# Patient Record
Sex: MALE | Race: ASIAN | NOT HISPANIC OR LATINO | URBAN - METROPOLITAN AREA
[De-identification: names, ages, dates, MRNs, and addresses within clinical notes are randomized per-mention and may not be internally consistent; named-entity substitution may affect disease eponyms.]

---

## 2017-07-14 ENCOUNTER — OUTPATIENT (OUTPATIENT)
Dept: EMERGENCY DEPT | Facility: HOSPITAL | Age: 22
LOS: 1 days | Discharge: HOME | End: 2017-07-14

## 2017-07-14 VITALS
HEART RATE: 57 BPM | RESPIRATION RATE: 18 BRPM | SYSTOLIC BLOOD PRESSURE: 136 MMHG | OXYGEN SATURATION: 100 % | DIASTOLIC BLOOD PRESSURE: 83 MMHG | TEMPERATURE: 98 F

## 2017-07-14 NOTE — ED ADULT TRIAGE NOTE - CHIEF COMPLAINT QUOTE
Pt states he was eating steak and began choking after swallowing a piece of the meat; states a family member performed the Heimlich Maneuver on him but nothing was expelled and he has the lingering sensation that the meat is still lodged in his throat. Pts states similar incident happened to him 3 years ago with chicken and surgical removal was necessary. Pt able to speak in complete clear sentences, respirations even and non labored.

## 2017-07-15 VITALS
HEIGHT: 65 IN | DIASTOLIC BLOOD PRESSURE: 97 MMHG | TEMPERATURE: 98 F | WEIGHT: 214.51 LBS | HEART RATE: 67 BPM | SYSTOLIC BLOOD PRESSURE: 119 MMHG | RESPIRATION RATE: 16 BRPM | OXYGEN SATURATION: 100 %

## 2017-07-15 DIAGNOSIS — Z29.9 ENCOUNTER FOR PROPHYLACTIC MEASURES, UNSPECIFIED: ICD-10-CM

## 2017-07-15 DIAGNOSIS — T18.128A FOOD IN ESOPHAGUS CAUSING OTHER INJURY, INITIAL ENCOUNTER: ICD-10-CM

## 2017-07-15 DIAGNOSIS — R74.0 NONSPECIFIC ELEVATION OF LEVELS OF TRANSAMINASE AND LACTIC ACID DEHYDROGENASE [LDH]: ICD-10-CM

## 2017-07-15 DIAGNOSIS — E87.8 OTHER DISORDERS OF ELECTROLYTE AND FLUID BALANCE, NOT ELSEWHERE CLASSIFIED: ICD-10-CM

## 2017-07-15 LAB
ALBUMIN SERPL ELPH-MCNC: 4.6 G/DL — SIGNIFICANT CHANGE UP (ref 3.3–5)
ALP SERPL-CCNC: 61 U/L — SIGNIFICANT CHANGE UP (ref 60–270)
ALT FLD-CCNC: 29 U/L — SIGNIFICANT CHANGE UP (ref 4–41)
AST SERPL-CCNC: 46 U/L — HIGH (ref 4–40)
BASOPHILS # BLD AUTO: 0.05 K/UL — SIGNIFICANT CHANGE UP (ref 0–0.2)
BASOPHILS NFR BLD AUTO: 0.7 % — SIGNIFICANT CHANGE UP (ref 0–2)
BILIRUB SERPL-MCNC: 0.6 MG/DL — SIGNIFICANT CHANGE UP (ref 0.2–1.2)
BUN SERPL-MCNC: 20 MG/DL — SIGNIFICANT CHANGE UP (ref 7–23)
CALCIUM SERPL-MCNC: 8.9 MG/DL — SIGNIFICANT CHANGE UP (ref 8.4–10.5)
CHLORIDE SERPL-SCNC: 105 MMOL/L — SIGNIFICANT CHANGE UP (ref 98–107)
CO2 SERPL-SCNC: 18 MMOL/L — LOW (ref 22–31)
CREAT SERPL-MCNC: 0.82 MG/DL — SIGNIFICANT CHANGE UP (ref 0.5–1.3)
EOSINOPHIL # BLD AUTO: 0.19 K/UL — SIGNIFICANT CHANGE UP (ref 0–0.5)
EOSINOPHIL NFR BLD AUTO: 2.5 % — SIGNIFICANT CHANGE UP (ref 0–6)
GLUCOSE SERPL-MCNC: 89 MG/DL — SIGNIFICANT CHANGE UP (ref 70–99)
HCT VFR BLD CALC: 43.8 % — SIGNIFICANT CHANGE UP (ref 39–50)
HGB BLD-MCNC: 15.4 G/DL — SIGNIFICANT CHANGE UP (ref 13–17)
IMM GRANULOCYTES # BLD AUTO: 0.03 # — SIGNIFICANT CHANGE UP
IMM GRANULOCYTES NFR BLD AUTO: 0.4 % — SIGNIFICANT CHANGE UP (ref 0–1.5)
LYMPHOCYTES # BLD AUTO: 2.44 K/UL — SIGNIFICANT CHANGE UP (ref 1–3.3)
LYMPHOCYTES # BLD AUTO: 32.2 % — SIGNIFICANT CHANGE UP (ref 13–44)
MCHC RBC-ENTMCNC: 27.9 PG — SIGNIFICANT CHANGE UP (ref 27–34)
MCHC RBC-ENTMCNC: 35.2 % — SIGNIFICANT CHANGE UP (ref 32–36)
MCV RBC AUTO: 79.3 FL — LOW (ref 80–100)
MONOCYTES # BLD AUTO: 0.63 K/UL — SIGNIFICANT CHANGE UP (ref 0–0.9)
MONOCYTES NFR BLD AUTO: 8.3 % — SIGNIFICANT CHANGE UP (ref 2–14)
NEUTROPHILS # BLD AUTO: 4.24 K/UL — SIGNIFICANT CHANGE UP (ref 1.8–7.4)
NEUTROPHILS NFR BLD AUTO: 55.9 % — SIGNIFICANT CHANGE UP (ref 43–77)
NRBC # FLD: 0 — SIGNIFICANT CHANGE UP
PLATELET # BLD AUTO: 229 K/UL — SIGNIFICANT CHANGE UP (ref 150–400)
PMV BLD: 11.1 FL — SIGNIFICANT CHANGE UP (ref 7–13)
POTASSIUM SERPL-MCNC: SIGNIFICANT CHANGE UP MMOL/L (ref 3.5–5.3)
POTASSIUM SERPL-SCNC: SIGNIFICANT CHANGE UP MMOL/L (ref 3.5–5.3)
PROT SERPL-MCNC: 8.6 G/DL — HIGH (ref 6–8.3)
RBC # BLD: 5.52 M/UL — SIGNIFICANT CHANGE UP (ref 4.2–5.8)
RBC # FLD: 13.5 % — SIGNIFICANT CHANGE UP (ref 10.3–14.5)
SODIUM SERPL-SCNC: 141 MMOL/L — SIGNIFICANT CHANGE UP (ref 135–145)
WBC # BLD: 7.58 K/UL — SIGNIFICANT CHANGE UP (ref 3.8–10.5)
WBC # FLD AUTO: 7.58 K/UL — SIGNIFICANT CHANGE UP (ref 3.8–10.5)

## 2017-07-15 RX ORDER — ONDANSETRON 8 MG/1
4 TABLET, FILM COATED ORAL EVERY 8 HOURS
Qty: 0 | Refills: 0 | Status: DISCONTINUED | OUTPATIENT
Start: 2017-07-15 | End: 2017-07-15

## 2017-07-15 RX ORDER — GLUCAGON INJECTION, SOLUTION 0.5 MG/.1ML
1 INJECTION, SOLUTION SUBCUTANEOUS ONCE
Qty: 0 | Refills: 0 | Status: DISCONTINUED | OUTPATIENT
Start: 2017-07-15 | End: 2017-07-15

## 2017-07-15 RX ORDER — ONDANSETRON 8 MG/1
4 TABLET, FILM COATED ORAL ONCE
Qty: 0 | Refills: 0 | Status: COMPLETED | OUTPATIENT
Start: 2017-07-15 | End: 2017-07-15

## 2017-07-15 RX ORDER — GLUCAGON INJECTION, SOLUTION 0.5 MG/.1ML
1 INJECTION, SOLUTION SUBCUTANEOUS ONCE
Qty: 0 | Refills: 0 | Status: COMPLETED | OUTPATIENT
Start: 2017-07-15 | End: 2017-07-15

## 2017-07-15 RX ADMIN — ONDANSETRON 4 MILLIGRAM(S): 8 TABLET, FILM COATED ORAL at 10:15

## 2017-07-15 RX ADMIN — GLUCAGON INJECTION, SOLUTION 1 MILLIGRAM(S): 0.5 INJECTION, SOLUTION SUBCUTANEOUS at 02:34

## 2017-07-15 RX ADMIN — ONDANSETRON 4 MILLIGRAM(S): 8 TABLET, FILM COATED ORAL at 02:25

## 2017-07-15 NOTE — H&P ADULT - ASSESSMENT
21-year-old male presents from home with feeling of choking after eating steak, concern for food bolus impaction to be scoped by GI.

## 2017-07-15 NOTE — ED ADULT NURSE REASSESSMENT NOTE - NS ED NURSE REASSESS COMMENT FT1
rec'd pt. a&ox4, appears in NAD, breathes with ease, denies any pain nor SOB at this time. pt. states "I still feel something is stuck on my throat." pt. speaks clearly, in full sentences, no drooling. pt. noted felt nauseous, tries to vomit/spit. awaits MD alvarado. will continue to monitor

## 2017-07-15 NOTE — ED PROVIDER NOTE - PROGRESS NOTE DETAILS
[facility] PGY4: Sx unchanged with meds. Xrays unrevealing. Seen by ENT, scoped at bedside, no obvious findings. d/w GI, agree with plan for admission. Called DOD Dr. Jeff, no answer, left message [facility] PGY4: Accepted [facility] text page sent

## 2017-07-15 NOTE — CHART NOTE - NSCHARTNOTEFT_GEN_A_CORE
ENDOSCOPY NOTE    EGD performed in the OR. Food bolus removed from mid-esophagus. Biopsies done to rule out eosinophilic esophagitis.    Plan:  - clear liquid diet - advance to soft diet as tolerated  - if tolerating diet, can be discharged to home  - outpatient GI follow-up

## 2017-07-15 NOTE — H&P ADULT - NSHPLABSRESULTS_GEN_ALL_CORE
07-15    141  |  105  |  20  ----------------------------<  89  x    |  18<L>  |  0.82    Ca    8.9      15 Jul 2017 02:15    TPro  8.6<H>  /  Alb  4.6  /  TBili  0.6  /  DBili  x   /  AST  46<H>  /  ALT  29  /  AlkPhos  61  07-15                        15.4   7.58  )-----------( 229      ( 15 Jul 2017 02:15 )             43.8     LIVER FUNCTIONS - ( 15 Jul 2017 02:15 )  Alb: 4.6 g/dL / Pro: 8.6 g/dL / ALK PHOS: 61 u/L / ALT: 29 u/L / AST: 46 u/L / GGT: x    CXR - lungs clear    neck x-ray - no radio-opaque foreign bodies

## 2017-07-15 NOTE — H&P ADULT - NSHPSOCIALHISTORY_GEN_ALL_CORE
Social History:    Marital Status:  (   )    ( x  ) Single    (   )    (  )   Lives with: (  ) alone  (  ) children   (  ) spouse   ( x ) parents  (  ) other    Substance Use (street drugs): ( x ) never used  (  ) other:  Tobacco Usage:  ( x  ) never smoked   (   ) former smoker   (   ) current smoker  (     ) pack year  (        ) last cigarette date  Alcohol Usage: Denies

## 2017-07-15 NOTE — CONSULT NOTE ADULT - ASSESSMENT
Impression:  1) Food impaction - recurrent - concern for eosinophilic esophagitis    Plan:  - urgent endoscopy with removal of food bolus - to be done this AM  - NPO  - after endoscopy, will need GI follow-up - [phone]

## 2017-07-15 NOTE — H&P ADULT - NSHPREVIEWOFSYSTEMS_GEN_ALL_CORE
REVIEW OF SYSTEMS:    CONSTITUTIONAL: No weakness, fevers or chills  EYES/ENT: No visual changes;  No dysphagia  NECK: No pain or stiffness  RESPIRATORY: No cough, wheezing, hemoptysis; No shortness of breath  CARDIOVASCULAR: No chest pain or palpitations; No lower extremity edema  GASTROINTESTINAL: No abdominal or epigastric pain. No nausea, vomiting, or hematemesis; No diarrhea or constipation. No melena or hematochezia.  GENITOURINARY: No dysuria, frequency or hematuria  NEUROLOGICAL: No numbness or weakness  SKIN: No itching, burning, rashes, or lesions   All other review of systems is negative unless indicated above.

## 2017-07-15 NOTE — ED PROVIDER NOTE - OBJECTIVE STATEMENT
21M with hx food bolus requiring EGD to remove p/w choking on food. Was eating steak around 2130 and had sudden pain in throat, felt like choking, friend attempted Heimlich maneuver without success, then tried ginger ale also without success. Feels similar to previous episode. Spitting up on eval. Denies stridor, vomiting.

## 2017-07-15 NOTE — DISCHARGE NOTE ADULT - CARE PLAN
Principal Discharge DX:	Food impaction of esophagus, initial encounter  Goal:	advance diet as tolerated  Instructions for follow-up, activity and diet:	Please follow up with GI office [phone] upon discharge, call to schedule appointment  Secondary Diagnosis:	Transaminitis  Goal:	outpatient follow up for repeat CMP/GTT.  Instructions for follow-up, activity and diet:	Please call and schedule appointment with your primary care provider upon 2-3 days from discharge Principal Discharge DX:	Food impaction of esophagus, initial encounter  Goal:	advance diet as tolerated  Instructions for follow-up, activity and diet:	Please follow up with GI office [phone] upon discharge, call to schedule appointment  Secondary Diagnosis:	Transaminitis  Goal:	outpatient follow up for repeat CMP/GTT.  Instructions for follow-up, activity and diet:	Please call and schedule appointment with your primary care provider upon 2-3 days from discharge Principal Discharge DX:	Food impaction of esophagus, initial encounter  Goal:	advance diet as tolerated  Instructions for follow-up, activity and diet:	Please follow up with GI office [phone] upon discharge, call to schedule appointment  Secondary Diagnosis:	Transaminitis  Goal:	outpatient follow up for repeat CMP/GTT.  Instructions for follow-up, activity and diet:	Please call and schedule appointment with your primary care provider upon 2-3 days from discharge

## 2017-07-15 NOTE — ED PROVIDER NOTE - MEDICAL DECISION MAKING DETAILS
will check labs, xr and cll ent for possible scope, in the mean time will try glucagon with zofran and give fluids

## 2017-07-15 NOTE — ED PROVIDER NOTE - ATTENDING CONTRIBUTION TO CARE
Chuck CHINCHILLA- 22 Y/O M p/w foreign body sensation with feeling of food being stuck in the neck 1 hr prior to arrival. Pt ate some steak and had this feeling, pt had similar episode many yrs ago and ha dot have endoscopy at that time, today he vomited couple of times, was spitting but not choking. Pt points to lower neck and sternal notch.    Pt is alert, well appearing male, no acute distress, speaking in full sentences, airway patent, epiglottis visible, no foreign body on oral inspection, uvula midline, no drooling, s1s2 normal reg, b/l clear breathe sounds, trachea midline, abd soft, nt, nd, neuro exam aox3, cn 2-12  intact, skin warm, dry, good turgor    ddx: food impaction    will consider xr chest and xr neck , try glucagon, call ent for scope to possibly removal upper esophagus steak, it was non bony meat per patient

## 2017-07-15 NOTE — H&P ADULT - HISTORY OF PRESENT ILLNESS
21 -year-old male with 21 -year-old male with one prior episode of food bolus impaction requiring endoscopy for bolus removal presents from home with feeling of choking after eating steak at around 2130 last night.  Heimlich maneuver was attempted without relief, patient reports he has been having nausea and has been spitting up clear saliva, denies vomiting.  No relief with ginger ale.  Last time this happened was after eating chicken.  Patient denies fevers/chills, chest pain, palpitations, abdominal pain, diarrhea, constipation, arthritis, skin changes/rashes, Raynaud's, dry mouth or dry eyes.  Patient currently feels like the food is stuck in his throat.  Patient was seen by ENT and they couldn't see a food bolus/impaction.  GI consult called by ED/pending. Per patient, no prior problems with anesthesia with prior endoscopy.     In the ED VS:  97.8  56  124/45  17  100%RA, received glucagon 1mg IM x1 and ondansetron 4mg IV x1

## 2017-07-15 NOTE — H&P ADULT - PROBLEM SELECTOR PLAN 1
ENT scoped, unable to visualize per pt/ED note, ENT note pending  plan for endoscopy, GI consulted by ED, awaiting consult  no difficulty breathing, no longer nauseas  ondansetron PRN  NPO for now ENT scoped, unable to visualize per pt/ED note, ENT note pending  GI consulted by ED - plan for emergent endoscopy this AM  no difficulty breathing, no longer nauseas  ondansetron PRN  NPO

## 2017-07-15 NOTE — H&P ADULT - NSHPPHYSICALEXAM_GEN_ALL_CORE
PHYSICAL EXAM:  GENERAL: NAD, well-developed, well-nourished  HEAD:  Atraumatic, Normocephalic  EYES: EOMI, PERRLA, conjunctiva and sclera clear  NECK: Supple, No JVD, pain to palpation anteriorly  CHEST/LUNG: Clear to auscultation bilaterally; No wheezes, rales or rhonchi  HEART: Regular rate and rhythm; No murmurs, rubs, or gallops, (+)S1, S2  ABDOMEN: Soft, Nontender, Nondistended; Normal Bowel sounds   EXTREMITIES:  2+ Peripheral Pulses, No clubbing, cyanosis, or edema  PSYCH: normal mood and affect  NEUROLOGY: AAOx3, non-focal  SKIN: No rashes or lesions

## 2017-07-15 NOTE — CONSULT NOTE ADULT - ATTENDING COMMENTS
Patient seen and examined. Mother at bedside. Admitted with esophageal food bolus impaction (steak). C/O food bolus hang up at sternal notch level- not able to tolerate any po intake. No pain,SOB,CP, abd. pain or any other complaints. Plan for EGD and FB removal. Will evaluate for EoE at time of EGD. R/B/A of EGD discussed with patient and mother.

## 2017-07-15 NOTE — DISCHARGE NOTE ADULT - ADDITIONAL INSTRUCTIONS
Emergent Endoscopy results:   - Food in the middle third of the esophagus Removal was                        successful.                       - Esophagus biopsied to rule out eosinophilic                        esophagitis.                       - Normal stomach.                       - Duodenitis.                       - Normal 2nd part of the duodenum.

## 2017-07-15 NOTE — CONSULT NOTE ADULT - SUBJECTIVE AND OBJECTIVE BOX
HPI:  21 -year-old male with one prior episode of food bolus impaction requiring endoscopy for bolus removal presents from home with feeling of choking after eating steak at around 2130 last night. Heimlich maneuver was attempted without relief, patient reports he has been having nausea and has been spitting up clear saliva. Denies vomiting. Able to tolerate his secretions but unable to tolerate anything else. Last time this happened was after eating chicken. Patient currently feels like the food is stuck in his throat. Patient does not have any known immunologic/rheumatologic disorder. ENT was called to evaluate the upper airway for potential food bolus impaction.    PAST MEDICAL & SURGICAL HISTORY:  No pertinent past medical history  No significant past surgical history    Allergies  No Known Allergies    REVIEW OF SYSTEMS:  Not relevant except for HPI    Vital Signs Last 24 Hrs  T(C): 36.6 (15 Jul 2017 04:58), Max: 36.8 (14 Jul 2017 22:59)  T(F): 97.8 (15 Jul 2017 04:58), Max: 98.3 (14 Jul 2017 22:59)  HR: 56 (15 Jul 2017 04:58) (56 - 64)  BP: 124/45 (15 Jul 2017 04:58) (124/45 - 145/88)  BP(mean): --  RR: 17 (15 Jul 2017 04:58) (17 - 18)  SpO2: 100% (15 Jul 2017 04:58) (100% - 100%)    PHYSICAL EXAM:  Constitutional Normal: well nourished, well developed, non-dysmorphic, no acute distress  Ears: pinnae wnl	  Nose:  Normal, no structural deformities. Normal mucosa, no turbinate hypertrophy, straight septum			  OC/OP: tongue midline, no lesions or ulcerations, tonsils +1  Neck: No palpable lymphadenopathy  Pulmonary: No resp distress, stridor or stertor.  Neurologic: awake and alert      Fiberoptic Laryngoscopy-  epiglottis sharp, vocal cords mobile bilaterally with no lesions, no food bolus or other FB visualized    A/P  21M otherwise healthy presenting with recurrent food bolus impaction.  -GI consult  -consider workup for immunologic/rheumatologic disorder  -consider SLP evaluation  -will discuss with attending
GASTROENTEROLOGY INITIAL CONSULT NOTE    Chief Complaint:  Patient is a 21y old  Male who presents with a chief complaint of     HPI: 21y Male with no significant PMH presenting with food impaction. Patient was eating 'dry' steak last night around 9:30pm. He took a big bite and felt the food get 'stuck'. A friend tried giving abdominal thrusts but was unable to dislodge the food. Since then, the patient is unable to keep anything down - he has tried drinking ginger ale and water and cannot pass it. He denies any difficulty or pain with swallowing. No nausea or vomiting. He had a similar episode about 3 years ago while eating chicken which required endoscopic removal at [facility]. Normally, he does not have difficulty or pain with swallowing.     Allergies:  No Known Allergies      Hospital Medications:      PMHX/PSHX:  No pertinent past medical history      Family history:      Social History:     ROS:     General:  No wt loss, fevers, chills, night sweats, fatigue,   Eyes:  Good vision, no reported pain  ENT:  No sore throat, pain, runny nose, dysphagia  CV:  No pain, palpitations, hypo/hypertension  Resp:  No dyspnea, cough, tachypnea, wheezing  GI:  see HPI  :  No pain, bleeding, incontinence, nocturia  Muscle:  No pain, weakness  Neuro:  No weakness, tingling, memory problems  Psych:  No fatigue, insomnia, mood problems, depression  Endocrine:  No polyuria, polydipsia, cold/heat intolerance  Heme:  No petechiae, ecchymosis, easy bruisability  Skin:  No rash, tattoos, scars, edema      PHYSICAL EXAM:   Vital Signs:  Vital Signs Last 24 Hrs  T(C): 36.6 (15 Jul 2017 04:58), Max: 36.8 (14 Jul 2017 22:59)  T(F): 97.8 (15 Jul 2017 04:58), Max: 98.3 (14 Jul 2017 22:59)  HR: 56 (15 Jul 2017 04:58) (56 - 64)  BP: 124/45 (15 Jul 2017 04:58) (124/45 - 145/88)  BP(mean): --  RR: 17 (15 Jul 2017 04:58) (17 - 18)  SpO2: 100% (15 Jul 2017 04:58) (100% - 100%)  Daily     Daily     GENERAL:  no acute distress  HEENT:  -icterus   CHEST:  clear bilaterally, no wheezes or rales  HEART:  RRR, S1S2  ABDOMEN:  soft, non-tender, non-distended, normoactive bowel sounds,  no hepato-splenomegaly  EXTEREMITIES:  no  edema  SKIN:  No rash/erythema/ecchymoses/petechiae/wounds/abscess/warm/dry  NEURO:  alert, oriented, conversant     LABS:                        15.4   7.58  )-----------( 229      ( 15 Jul 2017 02:15 )             43.8     07-15    141  |  105  |  20  ----------------------------<  89  x    |  18<L>  |  0.82    Ca    8.9      15 Jul 2017 02:15    TPro  8.6<H>  /  Alb  4.6  /  TBili  0.6  /  DBili  x   /  AST  46<H>  /  ALT  29  /  AlkPhos  61  07-15    LIVER FUNCTIONS - ( 15 Jul 2017 02:15 )  Alb: 4.6 g/dL / Pro: 8.6 g/dL / ALK PHOS: 61 u/L / ALT: 29 u/L / AST: 46 u/L / GGT: x                 Imaging:

## 2017-07-15 NOTE — ED ADULT NURSE REASSESSMENT NOTE - NS ED NURSE REASSESS COMMENT FT1
pt. asleep but arousable, in NAD, denies feeling nauseated at this time. denies any SOB nor dysphagia but states "still feel something in the throat". awaits xr results and ENT consult. will continue to monitor

## 2017-07-15 NOTE — DISCHARGE NOTE ADULT - HOSPITAL COURSE
21-year-old male presents from home with feeling of choking after eating steak, concern for food bolus impaction to be scoped by GI.      Food impaction of esophagus  -ENT scoped, unable to visualize per pt/ED note, ENT note pending  -GI consulted by ED - plan for emergent endoscopy this AM  -ENT scoped, unable to visualize per pt/ED note  -s/p emergent endoscopy with GI this am (ENT scoped, unable to visualize)    Transaminitis  -mild transaminitis likely hypersteatosis  outpatient follow up for repeat CMP/GTT. 21-year-old male presents from home with feeling of choking after eating steak, concern for food bolus impaction to be scoped by GI.      Food impaction of esophagus  -ENT scoped, unable to visualize per pt/ED note  -GI consulted by ED - plan for emergent endoscopy  -s/p emergent endoscopy 7/15, Food found  in the middle third of the esophagus Removal was successful, Esophagus biopsied to rule out eosinophilic esophagitis.  -Post EGD, Pt started on clear, diet advanced as tolerated    Mild Transaminitis  -mild transaminitis likely hypersteatosis  outpatient follow up for repeat CMP/GTT.     Pt medically stable, able to tolerate Po intake. Pt d/c home, Pt to f/u Outpt with GI for biopsy results

## 2017-07-15 NOTE — DISCHARGE NOTE ADULT - PLAN OF CARE
outpatient follow up for repeat CMP/GTT. Please call and schedule appointment with your primary care provider upon 2-3 days from discharge advance diet as tolerated Please follow up with GI office [phone] upon discharge, call to schedule appointment

## 2017-07-15 NOTE — DISCHARGE NOTE ADULT - PATIENT PORTAL LINK FT
“You can access the FollowHealth Patient Portal, offered by [facility], by registering with the following website: [email or web address]”

## 2017-07-15 NOTE — ED ADULT NURSE NOTE - OBJECTIVE STATEMENT
Pt received in #22, aaox3 with c/o throat discomfort possibly related to fbo. States he was eating steak tonight, felt a choking sensation, family performed the Heimlich maneuver but no object was expressed. Denies difficulty breathing or respiratory compromise. States difficulty swallowing but no drooling noted. Similar episode 3 years prior which required removal in OR.

## 2017-07-18 LAB — SURGICAL PATHOLOGY STUDY: SIGNIFICANT CHANGE UP

## 2020-06-10 ENCOUNTER — APPOINTMENT (OUTPATIENT)
Dept: ORTHOPEDIC SURGERY | Facility: CLINIC | Age: 25
End: 2020-06-10
Payer: COMMERCIAL

## 2020-06-10 VITALS
SYSTOLIC BLOOD PRESSURE: 115 MMHG | BODY MASS INDEX: 39.24 KG/M2 | TEMPERATURE: 97.6 F | HEART RATE: 92 BPM | HEIGHT: 67 IN | WEIGHT: 250 LBS | DIASTOLIC BLOOD PRESSURE: 73 MMHG

## 2020-06-10 DIAGNOSIS — Z72.3 LACK OF PHYSICAL EXERCISE: ICD-10-CM

## 2020-06-10 DIAGNOSIS — Z78.9 OTHER SPECIFIED HEALTH STATUS: ICD-10-CM

## 2020-06-10 PROCEDURE — 73130 X-RAY EXAM OF HAND: CPT | Mod: RT

## 2020-06-10 PROCEDURE — 99204 OFFICE O/P NEW MOD 45 MIN: CPT | Mod: 25

## 2020-06-10 PROCEDURE — 29125 APPL SHORT ARM SPLINT STATIC: CPT | Mod: RT

## 2020-06-10 RX ORDER — MULTIVITAMIN
TABLET ORAL
Refills: 0 | Status: ACTIVE | COMMUNITY

## 2020-06-24 ENCOUNTER — TRANSCRIPTION ENCOUNTER (OUTPATIENT)
Age: 25
End: 2020-06-24

## 2020-07-20 ENCOUNTER — APPOINTMENT (OUTPATIENT)
Dept: ORTHOPEDIC SURGERY | Facility: CLINIC | Age: 25
End: 2020-07-20
Payer: COMMERCIAL

## 2020-07-20 VITALS
SYSTOLIC BLOOD PRESSURE: 143 MMHG | HEIGHT: 67 IN | HEART RATE: 106 BPM | DIASTOLIC BLOOD PRESSURE: 84 MMHG | WEIGHT: 250 LBS | BODY MASS INDEX: 39.24 KG/M2

## 2020-07-20 DIAGNOSIS — S56.129A LACERATION OF FLEXOR MUSCLE, FASCIA AND TENDON OF UNSPECIFIED FINGER AT FOREARM LEVEL, INITIAL ENCOUNTER: ICD-10-CM

## 2020-07-20 DIAGNOSIS — S61.209A LACERATION OF FLEXOR MUSCLE, FASCIA AND TENDON OF UNSPECIFIED FINGER AT FOREARM LEVEL, INITIAL ENCOUNTER: ICD-10-CM

## 2020-07-20 DIAGNOSIS — S61.419D: ICD-10-CM

## 2020-07-20 DIAGNOSIS — S66.929D: ICD-10-CM

## 2020-07-20 PROCEDURE — 99214 OFFICE O/P EST MOD 30 MIN: CPT

## 2020-08-24 ENCOUNTER — APPOINTMENT (OUTPATIENT)
Dept: ORTHOPEDIC SURGERY | Facility: CLINIC | Age: 25
End: 2020-08-24

## 2021-04-12 ENCOUNTER — NON-APPOINTMENT (OUTPATIENT)
Age: 26
End: 2021-04-12

## 2021-04-12 ENCOUNTER — APPOINTMENT (OUTPATIENT)
Dept: INTERNAL MEDICINE | Facility: CLINIC | Age: 26
End: 2021-04-12
Payer: COMMERCIAL

## 2021-04-12 VITALS
RESPIRATION RATE: 16 BRPM | HEIGHT: 67 IN | BODY MASS INDEX: 39.87 KG/M2 | OXYGEN SATURATION: 98 % | WEIGHT: 254 LBS | SYSTOLIC BLOOD PRESSURE: 140 MMHG | HEART RATE: 85 BPM | DIASTOLIC BLOOD PRESSURE: 80 MMHG | TEMPERATURE: 98 F

## 2021-04-12 VITALS — SYSTOLIC BLOOD PRESSURE: 118 MMHG | DIASTOLIC BLOOD PRESSURE: 78 MMHG

## 2021-04-12 DIAGNOSIS — Z78.9 OTHER SPECIFIED HEALTH STATUS: ICD-10-CM

## 2021-04-12 DIAGNOSIS — E66.9 OBESITY, UNSPECIFIED: ICD-10-CM

## 2021-04-12 DIAGNOSIS — R22.2 LOCALIZED SWELLING, MASS AND LUMP, TRUNK: ICD-10-CM

## 2021-04-12 DIAGNOSIS — Z20.9 CONTACT WITH AND (SUSPECTED) EXPOSURE TO UNSPECIFIED COMMUNICABLE DISEASE: ICD-10-CM

## 2021-04-12 DIAGNOSIS — F17.210 NICOTINE DEPENDENCE, CIGARETTES, UNCOMPLICATED: ICD-10-CM

## 2021-04-12 DIAGNOSIS — Z83.438 FAMILY HISTORY OF OTHER DISORDER OF LIPOPROTEIN METABOLISM AND OTHER LIPIDEMIA: ICD-10-CM

## 2021-04-12 DIAGNOSIS — Z83.3 FAMILY HISTORY OF DIABETES MELLITUS: ICD-10-CM

## 2021-04-12 DIAGNOSIS — Z00.00 ENCOUNTER FOR GENERAL ADULT MEDICAL EXAMINATION W/OUT ABNORMAL FINDINGS: ICD-10-CM

## 2021-04-12 PROCEDURE — 36415 COLL VENOUS BLD VENIPUNCTURE: CPT

## 2021-04-12 PROCEDURE — 99385 PREV VISIT NEW AGE 18-39: CPT | Mod: 25

## 2021-04-12 PROCEDURE — 99072 ADDL SUPL MATRL&STAF TM PHE: CPT

## 2021-04-12 NOTE — HISTORY OF PRESENT ILLNESS
[FreeTextEntry1] : annual PE [de-identified] : Pt is a 24 y/o male with a hx of laceration to the hand and fingers with severed tendons s/p repair here for annual Pe\par Hand function back to baseline.\par no other c/o.\par \par Smoker - social - has been cutting down.\par \par tet - 5/20

## 2021-04-12 NOTE — HEALTH RISK ASSESSMENT
[] : Yes [Yes] : Yes [2 - 4 times a month (2 pts)] : 2-4 times a month (2 points) [1 or 2 (0 pts)] : 1 or 2 (0 points) [Never (0 pts)] : Never (0 points) [No] : In the past 12 months have you used drugs other than those required for medical reasons? No [No falls in past year] : Patient reported no falls in the past year [0] : 2) Feeling down, depressed, or hopeless: Not at all (0) [None] : None [With Family] : lives with family [Employed] : employed [Single] : single [Sexually Active] : sexually active [Feels Safe at Home] : Feels safe at home [Fully functional (bathing, dressing, toileting, transferring, walking, feeding)] : Fully functional (bathing, dressing, toileting, transferring, walking, feeding) [Fully functional (using the telephone, shopping, preparing meals, housekeeping, doing laundry, using] : Fully functional and needs no help or supervision to perform IADLs (using the telephone, shopping, preparing meals, housekeeping, doing laundry, using transportation, managing medications and managing finances) [Smoke Detector] : smoke detector [Carbon Monoxide Detector] : carbon monoxide detector [Seat Belt] :  uses seat belt [Sunscreen] : uses sunscreen [Audit-CScore] : 2 [[id number]] : 0 [Change in mental status noted] : No change in mental status noted [High Risk Behavior] : no high risk behavior [Reports changes in hearing] : Reports no changes in hearing [Reports changes in vision] : Reports no changes in vision [Reports changes in dental health] : Reports no changes in dental health

## 2021-04-12 NOTE — PHYSICAL EXAM
[No Acute Distress] : no acute distress [Well Nourished] : well nourished [Well Developed] : well developed [Well-Appearing] : well-appearing [Normal Sclera/Conjunctiva] : normal sclera/conjunctiva [PERRL] : pupils equal round and reactive to light [EOMI] : extraocular movements intact [Normal Outer Ear/Nose] : the outer ears and nose were normal in appearance [Normal Oropharynx] : the oropharynx was normal [Normal TMs] : both tympanic membranes were normal [No JVD] : no jugular venous distention [No Lymphadenopathy] : no lymphadenopathy [Supple] : supple [Thyroid Normal, No Nodules] : the thyroid was normal and there were no nodules present [No Respiratory Distress] : no respiratory distress  [No Accessory Muscle Use] : no accessory muscle use [Clear to Auscultation] : lungs were clear to auscultation bilaterally [Normal Rate] : normal rate  [Regular Rhythm] : with a regular rhythm [Normal S1, S2] : normal S1 and S2 [No Murmur] : no murmur heard [No Carotid Bruits] : no carotid bruits [No Abdominal Bruit] : a ~M bruit was not heard ~T in the abdomen [Pedal Pulses Present] : the pedal pulses are present [No Edema] : there was no peripheral edema [No Palpable Aorta] : no palpable aorta [Soft] : abdomen soft [Non Tender] : non-tender [Non-distended] : non-distended [No Masses] : no abdominal mass palpated [No HSM] : no HSM [Normal Bowel Sounds] : normal bowel sounds [No Hernias] : no hernias [Penis Abnormality] : normal circumcised penis [Testes Tenderness] : no tenderness of the testes [Testes Mass (___cm)] : there were no testicular masses [Normal Posterior Cervical Nodes] : no posterior cervical lymphadenopathy [Normal Anterior Cervical Nodes] : no anterior cervical lymphadenopathy [Normal Inguinal Nodes] : no inguinal lymphadenopathy [No CVA Tenderness] : no CVA  tenderness [No Spinal Tenderness] : no spinal tenderness [No Joint Swelling] : no joint swelling [Grossly Normal Strength/Tone] : grossly normal strength/tone [No Rash] : no rash [Coordination Grossly Intact] : coordination grossly intact [No Focal Deficits] : no focal deficits [Normal Gait] : normal gait [Speech Grossly Normal] : speech grossly normal [Memory Grossly Normal] : memory grossly normal [Normal Affect] : the affect was normal [Alert and Oriented x3] : oriented to person, place, and time [Normal Mood] : the mood was normal [Normal Insight/Judgement] : insight and judgment were intact [de-identified] : obese [de-identified] : mid upper back with firmish ` 2-3cm nodule, mobile, not tender

## 2021-04-13 DIAGNOSIS — R73.03 PREDIABETES.: ICD-10-CM

## 2021-04-13 DIAGNOSIS — E78.5 HYPERLIPIDEMIA, UNSPECIFIED: ICD-10-CM

## 2021-04-13 DIAGNOSIS — E55.9 VITAMIN D DEFICIENCY, UNSPECIFIED: ICD-10-CM

## 2021-04-13 LAB
25(OH)D3 SERPL-MCNC: 8.4 NG/ML
ALBUMIN SERPL ELPH-MCNC: 4.6 G/DL
ALP BLD-CCNC: 73 U/L
ALT SERPL-CCNC: 29 U/L
ANION GAP SERPL CALC-SCNC: 14 MMOL/L
AST SERPL-CCNC: 20 U/L
BASOPHILS # BLD AUTO: 0.05 K/UL
BASOPHILS NFR BLD AUTO: 0.7 %
BILIRUB SERPL-MCNC: 0.4 MG/DL
BUN SERPL-MCNC: 12 MG/DL
CALCIUM SERPL-MCNC: 9.8 MG/DL
CHLORIDE SERPL-SCNC: 101 MMOL/L
CHOLEST SERPL-MCNC: 214 MG/DL
CO2 SERPL-SCNC: 23 MMOL/L
CREAT SERPL-MCNC: 0.75 MG/DL
EOSINOPHIL # BLD AUTO: 0.23 K/UL
EOSINOPHIL NFR BLD AUTO: 3.3 %
ESTIMATED AVERAGE GLUCOSE: 131 MG/DL
GLUCOSE SERPL-MCNC: 83 MG/DL
HBA1C MFR BLD HPLC: 6.2 %
HCT VFR BLD CALC: 46.7 %
HDLC SERPL-MCNC: 37 MG/DL
HGB BLD-MCNC: 15.3 G/DL
HIV1+2 AB SPEC QL IA.RAPID: NONREACTIVE
IMM GRANULOCYTES NFR BLD AUTO: 0.3 %
IRON SATN MFR SERPL: 31 %
IRON SERPL-MCNC: 97 UG/DL
LDLC SERPL CALC-MCNC: 143 MG/DL
LYMPHOCYTES # BLD AUTO: 1.92 K/UL
LYMPHOCYTES NFR BLD AUTO: 27.5 %
MAN DIFF?: NORMAL
MCHC RBC-ENTMCNC: 26.7 PG
MCHC RBC-ENTMCNC: 32.8 GM/DL
MCV RBC AUTO: 81.5 FL
MONOCYTES # BLD AUTO: 0.45 K/UL
MONOCYTES NFR BLD AUTO: 6.5 %
NEUTROPHILS # BLD AUTO: 4.3 K/UL
NEUTROPHILS NFR BLD AUTO: 61.7 %
NONHDLC SERPL-MCNC: 177 MG/DL
PLATELET # BLD AUTO: 312 K/UL
POTASSIUM SERPL-SCNC: 4.5 MMOL/L
PROT SERPL-MCNC: 7.9 G/DL
RBC # BLD: 5.73 M/UL
RBC # FLD: 13.9 %
SODIUM SERPL-SCNC: 138 MMOL/L
TIBC SERPL-MCNC: 317 UG/DL
TRIGL SERPL-MCNC: 171 MG/DL
TSH SERPL-ACNC: 1.98 UIU/ML
UIBC SERPL-MCNC: 220 UG/DL
WBC # FLD AUTO: 6.97 K/UL

## 2021-04-13 RX ORDER — ADHESIVE TAPE 3"X 2.3 YD
50 MCG TAPE, NON-MEDICATED TOPICAL
Refills: 0 | Status: ACTIVE | COMMUNITY

## 2021-04-13 RX ORDER — ERGOCALCIFEROL 1.25 MG/1
1.25 MG CAPSULE, LIQUID FILLED ORAL
Qty: 4 | Refills: 1 | Status: ACTIVE | COMMUNITY
Start: 2021-04-13 | End: 1900-01-01

## 2021-07-26 ENCOUNTER — EMERGENCY (EMERGENCY)
Facility: HOSPITAL | Age: 26
LOS: 1 days | Discharge: HOME | End: 2021-07-26
Attending: EMERGENCY MEDICINE | Admitting: EMERGENCY MEDICINE
Payer: COMMERCIAL

## 2021-07-26 ENCOUNTER — NON-APPOINTMENT (OUTPATIENT)
Age: 26
End: 2021-07-26

## 2021-07-26 VITALS
HEART RATE: 89 BPM | TEMPERATURE: 98 F | HEIGHT: 65 IN | SYSTOLIC BLOOD PRESSURE: 140 MMHG | DIASTOLIC BLOOD PRESSURE: 80 MMHG | RESPIRATION RATE: 18 BRPM | OXYGEN SATURATION: 98 %

## 2021-07-26 LAB — SARS-COV-2 RNA SPEC QL NAA+PROBE: SIGNIFICANT CHANGE UP

## 2021-07-26 PROCEDURE — 71045 X-RAY EXAM CHEST 1 VIEW: CPT | Mod: 26

## 2021-07-26 PROCEDURE — 99285 EMERGENCY DEPT VISIT HI MDM: CPT

## 2021-07-26 RX ORDER — ALBUTEROL 90 UG/1
2 AEROSOL, METERED ORAL
Qty: 40 | Refills: 0
Start: 2021-07-26 | End: 2021-07-30

## 2021-07-26 RX ORDER — IPRATROPIUM/ALBUTEROL SULFATE 18-103MCG
3 AEROSOL WITH ADAPTER (GRAM) INHALATION ONCE
Refills: 0 | Status: COMPLETED | OUTPATIENT
Start: 2021-07-26 | End: 2021-07-26

## 2021-07-26 RX ORDER — ALBUTEROL 90 UG/1
2 AEROSOL, METERED ORAL ONCE
Refills: 0 | Status: COMPLETED | OUTPATIENT
Start: 2021-07-26 | End: 2021-07-26

## 2021-07-26 RX ORDER — ALBUTEROL 90 UG/1
2.5 AEROSOL, METERED ORAL ONCE
Refills: 0 | Status: COMPLETED | OUTPATIENT
Start: 2021-07-26 | End: 2021-07-26

## 2021-07-26 RX ADMIN — ALBUTEROL 2.5 MILLIGRAM(S): 90 AEROSOL, METERED ORAL at 12:35

## 2021-07-26 RX ADMIN — ALBUTEROL 2 PUFF(S): 90 AEROSOL, METERED ORAL at 16:58

## 2021-07-26 RX ADMIN — Medication 3 MILLILITER(S): at 14:52

## 2021-07-26 NOTE — ED ADULT NURSE NOTE - OBJECTIVE STATEMENT
Pt received to intake presents with runny nose/congestion and chills. pt a&ox3, ambulatory at baseline, pt medicated as per ordered, will continue to monitor.

## 2021-07-26 NOTE — ED PROVIDER NOTE - NSFOLLOWUPCLINICS_GEN_ALL_ED_FT
[facility]  Pulmonary Medicine  [address], [address]  [place] [address]  Phone: [phone]  Fax:   Follow Up Time: 1-3 Days

## 2021-07-26 NOTE — ED PROVIDER NOTE - CLINICAL SUMMARY MEDICAL DECISION MAKING FREE TEXT BOX
Otherwise healthy male p/w URI symptoms with mild exp wheeze on exam, no resp distress, well appearing.  Plan for cxr to eval for pna, albuterol neb, covid swab, reassess Otherwise healthy male p/w URI symptoms with mild exp wheeze on exam, no resp distress, well appearing.  Plan for cxr to eval for pna, albuterol neb, covid swab, reassess    MANDY Jacobs  CXR demonstrate no emergent findings  Reports improvement of symptoms following medications  RX medications. Asthma clinic referral

## 2021-07-26 NOTE — ED PROVIDER NOTE - PHYSICAL EXAMINATION
GEN - NAD; well appearing; A+O x3   HEAD - NC/AT   EYES- PERRL, EOMI  ENT: Airway patent, mmm, Oral cavity and pharynx normal.    NECK: Neck supple  PULMONARY - CTA b/l, symmetric breath sounds, mild diffuse exp wheezing, no resp distress   CARDIAC -s1s2, RRR, no M,G,R  ABDOMEN - +BS, ND, NT, soft, no guarding, no rebound, no masses   BACK - no CVA tenderness, Normal  spine   EXTREMITIES - FROM, symmetric pulses, capillary refill < 2 seconds, no edema   SKIN - no rash or bruising   NEUROLOGIC - alert, speech clear, no focal deficits  PSYCH -nl mood/affect, nl insight.

## 2021-07-26 NOTE — ED PROVIDER NOTE - PATIENT PORTAL LINK FT
You can access the FollowMyHealth Patient Portal offered by [facility] by registering at the following website: [email or web address]. By joining [email or web address]’s FollowMyHealth portal, you will also be able to view your health information using other applications (apps) compatible with our system.

## 2021-07-26 NOTE — ED PROVIDER NOTE - OBJECTIVE STATEMENT
25M p/w 3 days of running nose, congestion, cough, chest tightness and chills. Pt's girlfriend also had similar symptoms but now she is feeling better. Pt reports he is vaccinated against covid. No back pain, leg pain or swelling.

## 2022-01-10 NOTE — ED ADULT NURSE NOTE - NS PRO PASSIVE SMOKE EXP
Recently hospitalized due to decompensated CHF  Was given lasix, meds adjusted, next card appt in on 20th  Has CKD stage IV put improved with some diuresis          
Unknown

## 2022-07-11 NOTE — REVIEW OF SYSTEMS
[Negative] : Heme/Lymph [Joint Stiffness] : no joint stiffness [Muscle Pain] : no muscle pain [Muscle Weakness] : no muscle weakness [Back Pain] : no back pain [Joint Swelling] : no joint swelling [FreeTextEntry9] : occ knee pains [FreeTextEntry1] : no polyuria, polydipsia, polyphagia Azathioprine Pregnancy And Lactation Text: This medication is Pregnancy Category D and isn't considered safe during pregnancy. It is unknown if this medication is excreted in breast milk.

## 2023-04-13 NOTE — ED ADULT NURSE NOTE - ATTEMPT TO OOB
Clinic hours for Dr. Morrissey:  Monday    In Surgery  Tuesday 7:30am - 4:30pm  Wednesday 7:30am - 4:30pm  Thursday       7:30am - 4:30pm  Friday  7:30 - 11am [facility]    If you need a refill on your prescription, please call your pharmacy and let them know. Please be proactive and call before your medication runs out. The pharmacy will then contact us for the refill. Please allow 24-48 hours for the refill to be processed.     If your Physician/Specialty Provider has ordered additional laboratory or radiology testing to be done before your next scheduled office visit, those results will be discussed with you at that upcoming visit. This will allow you the opportunity to go over the results in person with your provider. If your results require immediate intervention, you will be contacted sooner by phone call.     If your Physician/Specialty Provider has ordered labs for you to be done either today during this office visit, or in between office visits, you may receive any non-urgent test results and recommendations via your [employer]. To retrieve these results and recommendations, please click on your lab result itself to see if any comments have been left for you from your provider. If you do not have a [employer], your provider's office will either call you with those results or you may reach out to the office yourself to obtain results.    You may be receiving a patient satisfaction survey in the mail or in your email. If you receive an email survey, please look for the subject line of: \" Your provider name\" would like your feedback\". Please take the time to complete your survey either via the mail or email, as your feedback is very important to us. We strive to make your experience exceptional and your comments help us with that goal. We look forward to hearing from you.         no